# Patient Record
Sex: FEMALE | Race: WHITE
[De-identification: names, ages, dates, MRNs, and addresses within clinical notes are randomized per-mention and may not be internally consistent; named-entity substitution may affect disease eponyms.]

---

## 2017-01-08 ENCOUNTER — HOSPITAL ENCOUNTER (EMERGENCY)
Dept: HOSPITAL 25 - OHEAST | Age: 52
Discharge: HOME | End: 2017-01-08
Payer: SELF-PAY

## 2017-01-08 DIAGNOSIS — Z00.00: Primary | ICD-10-CM

## 2017-06-13 ENCOUNTER — HOSPITAL ENCOUNTER (EMERGENCY)
Dept: HOSPITAL 25 - UCEAST | Age: 52
Discharge: LEFT BEFORE DISCHARGE | End: 2017-06-13
Payer: SELF-PAY

## 2017-06-13 ENCOUNTER — HOSPITAL ENCOUNTER (EMERGENCY)
Dept: HOSPITAL 25 - ED | Age: 52
Discharge: HOME | End: 2017-06-13
Payer: SELF-PAY

## 2017-06-13 VITALS — SYSTOLIC BLOOD PRESSURE: 123 MMHG | DIASTOLIC BLOOD PRESSURE: 72 MMHG

## 2017-06-13 VITALS — SYSTOLIC BLOOD PRESSURE: 124 MMHG | DIASTOLIC BLOOD PRESSURE: 68 MMHG
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
## 2017-06-13 DIAGNOSIS — S70.12XA: Primary | ICD-10-CM
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
## 2017-06-13 DIAGNOSIS — R60.9: ICD-10-CM

## 2017-06-13 DIAGNOSIS — Z53.20: ICD-10-CM

## 2017-06-13 DIAGNOSIS — L03.116: ICD-10-CM
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
## 2017-06-13 LAB
ALBUMIN SERPL BCG-MCNC: 3.7 G/DL (ref 3.2–5.2)
ALP SERPL-CCNC: 72 U/L (ref 34–104)
ALT SERPL W P-5'-P-CCNC: 34 U/L (ref 7–52)
ANION GAP SERPL CALC-SCNC: 6 MMOL/L (ref 2–11)
AST SERPL-CCNC: 24 U/L (ref 13–39)
BUN SERPL-MCNC: 12 MG/DL (ref 6–24)
BUN/CREAT SERPL: 14.6 (ref 8–20)
CALCIUM SERPL-MCNC: 9.5 MG/DL (ref 8.6–10.3)
CHLORIDE SERPL-SCNC: 107 MMOL/L (ref 101–111)
GLOBULIN SER CALC-MCNC: 3.1 G/DL (ref 2–4)
GLUCOSE SERPL-MCNC: 88 MG/DL (ref 70–100)
HCO3 SERPL-SCNC: 26 MMOL/L (ref 22–32)
HCT VFR BLD AUTO: 38 % (ref 35–47)
HGB BLD-MCNC: 12.6 G/DL (ref 12–16)
MCH RBC QN AUTO: 29 PG (ref 27–31)
MCHC RBC AUTO-ENTMCNC: 33 G/DL (ref 31–36)
MCV RBC AUTO: 89 FL (ref 80–97)
POTASSIUM SERPL-SCNC: 3.8 MMOL/L (ref 3.5–5)
PROT SERPL-MCNC: 6.8 G/DL (ref 6.4–8.9)
RBC # BLD AUTO: 4.34 10^6/UL (ref 4–5.4)
SODIUM SERPL-SCNC: 139 MMOL/L (ref 133–145)
WBC # BLD AUTO: 14 10^3/UL (ref 3.5–10.8)

## 2017-06-13 PROCEDURE — 99282 EMERGENCY DEPT VISIT SF MDM: CPT

## 2017-06-13 PROCEDURE — G0463 HOSPITAL OUTPT CLINIC VISIT: HCPCS

## 2017-06-13 PROCEDURE — 85025 COMPLETE CBC W/AUTO DIFF WBC: CPT

## 2017-06-13 PROCEDURE — 80053 COMPREHEN METABOLIC PANEL: CPT

## 2017-06-13 PROCEDURE — 36415 COLL VENOUS BLD VENIPUNCTURE: CPT

## 2017-06-13 PROCEDURE — 86141 C-REACTIVE PROTEIN HS: CPT

## 2017-06-13 PROCEDURE — 99212 OFFICE O/P EST SF 10 MIN: CPT

## 2017-06-13 PROCEDURE — 83605 ASSAY OF LACTIC ACID: CPT

## 2017-06-13 NOTE — RAD
HISTORY: Left upper leg swelling, hematoma



COMPARISONS: None relevant



TECHNIQUE: Multiple transverse and longitudinal ultrasound images were obtained of the

left upper extremity  from the level of the common femoral vein inferiorly through to the

infrapopliteal veins  using grayscale, color Doppler, and spectral Doppler imaging with

and without compression and with augmentation. Comparison images were obtained of the

contralateral common femoral vein.



FINDINGS:

VEINS: The venous system of the left upper extremity  is compressible throughout its

course, with normal flow on color Doppler imaging and normal response to augmentation on

spectral Doppler imaging.



SOFT TISSUES: In the area of trauma by history, in the left lateral thigh, there is a

loculated fluid collection measuring 30.4 x 4.4 x 10.5 cm in size



OTHER FINDINGS: None.



IMPRESSION: 

NO LEFT LOWER EXTREMITY DEEP VEIN THROMBOSIS.

LARGE HEMATOMA OF THE LEFT LATERAL THIGH

## 2017-06-13 NOTE — UC
Lower Extremity/Ankle HPI





- HPI Summary


HPI Summary: 





Patient presents to the  with CC of left upper thigh hematoma after a horse 

"ran over her" 4 days ago and landed on her thigh.  She notes to worsening pain

, hardness below the skin and redness around the area.  She is having 

difficulty walking.  She also endorses swelling in the left ankle.  Bruising 

and erythema over the sight of pain.  Exquisite tenderness on palpation.  She 

denies other injuries.  Pain does not radiate.





- History of Current Complaint


Chief Complaint: UCLowerExtremity


Stated Complaint: LEFT LEG INJURY


Time Seen by Provider: 06/13/17 16:21


Hx Obtained From: Patient


Hx Last Menstrual Period: 5/5/17


Pregnant?: No


Onset/Duration: Sudden Onset


Severity Initially: Moderate


Severity Currently: Moderate


Pain Intensity: 5


Pain Scale Used: 0-10 Numeric


Aggravating Factor(s): Standing, Ambulation


Alleviating Factor(s): Rest, Elevation


Able to Bear Weight: Yes





- Risk Factors


Gout Risk Factors: Age Over 40


DVT Risk Factors: Negative


Septic Arthritis Risk Factor: Negative





- Allergies/Home Medications


Allergies/Adverse Reactions: 


 Allergies











Allergy/AdvReac Type Severity Reaction Status Date / Time


 


Bee Venom Allergy  Anaphylatic Verified 06/13/17 15:53





   Shock  











Home Medications: 


 Home Medications





Multiple Vitamin [Multi Vitamin] 1 tab PO DAILY 06/13/17 [History Confirmed 06/ 13/17]


Sertraline* [Zoloft*] 1 tab PO DAILY 06/13/17 [History Confirmed 06/13/17]











PMH/Surg Hx/FS Hx/Imm Hx


Previously Healthy: Yes





- Surgical History


Surgical History: Yes


Surgery Procedure, Year, and Place: Hernia repair.  back surgery X3





- Family History


Known Family History: Positive: Unknown





- Social History


Occupation: Employed Full-time


Lives: With Family


Alcohol Use: None


Substance Use Type: None


Smoking Status (MU): Never Smoked Tobacco





- Immunization History


Most Recent Tetanus Shot: Unknown





Review of Systems


Constitutional: Negative


Skin: Bruising, Other - erythema around left lateral thigh


Respiratory: Negative


Cardiovascular: Negative


Motor: Decreased ROM


Neurovascular: Decreased Sensation


Musculoskeletal: Negative


Neurological: Negative


Psychological: Negative


All Other Systems Reviewed And Are Negative: Yes





Physical Exam


Triage Information Reviewed: Yes


Appearance: Well-Appearing, No Pain Distress, Well-Nourished


Vital Signs: 


 Initial Vital Signs











Temp  99.3 F   06/13/17 15:49


 


Pulse  81   06/13/17 15:49


 


Resp  16   06/13/17 15:49


 


BP  123/72   06/13/17 15:49


 


Pulse Ox  99   06/13/17 15:49











Vital Signs Reviewed: Yes


Eye Exam: Normal


ENT Exam: Normal


ENT: Positive: Normal ENT inspection


Dental Exam: Normal


Respiratory Exam: Normal


Respiratory: Positive: Chest non-tender, Lungs clear


Cardiovascular Exam: Normal


Musculoskeletal Exam: Normal


Musculoskeletal: Positive: Other: - pain on ROM


Neurological Exam: Normal


Neurological: Positive: Alert


Psychological: Positive: Normal Response To Family, Age Appropriate Behavior


Skin Exam: Normal





Lower Extremity Course/Dx





- Course


Course Of Treatment: left lateral thigh with pain, induration and erythema 

surrounding.  US not available in the UC.  patient encouraged to follow up with 

ED immediately for further workup.  She is agreeable to go by private car.  

Haley Lentz PA-C called at 5:10p.  She is stable upon transfer.





- Differential Dx/Diagnosis


Differential Diagnosis/HQI/PQRI: Contusion, Sprain, Strain, Tendonitis, Other - 

hematoma


Provider Diagnoses: Hematoma of the left leg





Discharge





- Discharge Plan


Condition: Stable


Disposition: AGAINST MEDICAL ADVICE


Discharge Disposition Comment: private car to the emergency room

## 2017-06-13 NOTE — ED
Skin Complaint





- HPI Summary


HPI Summary: 


51F presents with hematoma with potential infection on left upper leg.  She was 

stepped on by a horse four days ago. She states today that she notice some 

redness to the area with warmth that has been spreading.  She states the pain 

is intense and makes it difficult to walk.  She denies any fever.  She was 

wearing pants with the insistent occurred.  She denies any other injury.  She 

denies any abrasion to the area.  She states the entire leg has swollen up.  





- History of Current Complaint


Chief Complaint: EDExtremityLower


Time Seen by Provider: 06/13/17 17:37


Stated Complaint: LT LEG INJURY/SENT FROM [facility]


Hx Last Menstrual Period: 5/5/17


Pain Intensity: 7





- Allergy/Home Medications


Allergies/Adverse Reactions: 


 Allergies











Allergy/AdvReac Type Severity Reaction Status Date / Time


 


Bee Venom Allergy  Anaphylatic Verified 06/13/17 15:53





   Shock  














PMH/Surg Hx/FS Hx/Imm Hx


Endocrine/Hematology History: 


   Denies: Hx Diabetes, Hx Thyroid Disease


Cardiovascular History: 


   Denies: Hx Hypertension


Respiratory History: 


   Denies: Hx Asthma, Hx Chronic Obstructive Pulmonary Disease (COPD)


GI History: 


   Denies: Hx Ulcer





- Surgical History


Surgery Procedure, Year, and Place: Hernia repair.  back surgery X3





- Immunization History


Date of Tetanus Vaccine: UTD


Date of Influenza Vaccine: UTD


Infectious Disease History: No


Infectious Disease History: 


   Denies: Hx Clostridium Difficile, Hx Hepatitis, Hx Human Immunodeficiency 

Virus (HIV), Hx of Known/Suspected MRSA, Hx Shingles, Hx Tuberculosis, Hx Known/

Suspected VRE, Hx Known/Suspected VRSA, History Other Infectious Disease, 

Traveled Outside the [place] in Last 30 Days





- Family History


Known Family History: Positive: Unknown


   Negative: Cardiac Disease





- Social History


Alcohol Use: None


Substance Use Type: Reports: None


Smoking Status (MU): Never Smoked Tobacco





Review of Systems


Negative: Fever


Negative: Chest Pain


Negative: Shortness Of Breath


Positive: Edema - left upper leg


All Other Systems Reviewed And Are Negative: Yes





Physical Exam


Triage Information Reviewed: Yes


Vital Signs On Initial Exam: 


 Initial Vitals











Temp Pulse Resp BP Pulse Ox


 


 98.6 F   68   16   122/77   98 


 


 06/13/17 17:28  06/13/17 17:28  06/13/17 17:28  06/13/17 17:28  06/13/17 17:28











Vital Signs Reviewed: Yes


Appearance: Positive: Well-Appearing


Skin: Positive: Warm, Dry


Head/Face: Positive: Normal Head/Face Inspection


Eyes: Positive: Normal, Conjunctiva Clear


ENT: Positive: Normal ENT inspection, Pharynx normal, TMs normal


Respiratory/Lung Sounds: Positive: Clear to Auscultation, Breath Sounds Present


Cardiovascular: Positive: Normal, RRR


Musculoskeletal: Positive: Other - good pulses, capillary refill < 2 secs, 

tender to area of left thigh with hematoma felt underneath, large area of 

cellulitis present over anterior thigh with erythema and warm to touch





- Severino Coma Scale


Coma Scale Total: 15





Diagnostics





- Vital Signs


 Vital Signs











  Temp Pulse Resp BP Pulse Ox


 


 06/13/17 18:11  98.2 F  68  16  126/74  98


 


 06/13/17 17:28  98.6 F  68  16  122/77  98














- Laboratory


Lab Results: 


 Lab Results











  06/13/17 06/13/17 06/13/17 Range/Units





  18:24 18:24 18:24 


 


WBC  14.0 H    (3.5-10.8)  10^3/ul


 


RBC  4.34    (4.0-5.4)  10^6/ul


 


Hgb  12.6    (12.0-16.0)  g/dl


 


Hct  38    (35-47)  %


 


MCV  89    (80-97)  fL


 


MCH  29    (27-31)  pg


 


MCHC  33    (31-36)  g/dl


 


RDW  14    (10.5-15)  %


 


Plt Count  315    (150-450)  10^3/ul


 


MPV  7 L    (7.4-10.4)  um3


 


Neut % (Auto)  69.9    (38-83)  %


 


Lymph % (Auto)  21.7 L    (25-47)  %


 


Mono % (Auto)  6.2    (1-9)  %


 


Eos % (Auto)  1.3    (0-6)  %


 


Baso % (Auto)  0.9    (0-2)  %


 


Absolute Neuts (auto)  9.8 H    (1.5-7.7)  10^3/ul


 


Absolute Lymphs (auto)  3.0    (1.0-4.8)  10^3/ul


 


Absolute Monos (auto)  0.9 H    (0-0.8)  10^3/ul


 


Absolute Eos (auto)  0.2    (0-0.6)  10^3/ul


 


Absolute Basos (auto)  0.1    (0-0.2)  10^3/ul


 


Absolute Nucleated RBC  0    10^3/ul


 


Nucleated RBC %  0    


 


Sodium   139   (133-145)  mmol/L


 


Potassium   3.8   (3.5-5.0)  mmol/L


 


Chloride   107   (101-111)  mmol/L


 


Carbon Dioxide   26   (22-32)  mmol/L


 


Anion Gap   6   (2-11)  mmol/L


 


BUN   12   (6-24)  mg/dL


 


Creatinine   0.82   (0.51-0.95)  mg/dL


 


Est GFR ( Amer)   94.5   (>60)  


 


Est GFR (Non-Af Amer)   73.5   (>60)  


 


BUN/Creatinine Ratio   14.6   (8-20)  


 


Glucose   88   ()  mg/dL


 


Lactic Acid    0.6  (0.5-2.0)  mmol/L


 


Calcium   9.5   (8.6-10.3)  mg/dL


 


Total Bilirubin   0.50   (0.2-1.0)  mg/dL


 


AST   24   (13-39)  U/L


 


ALT   34   (7-52)  U/L


 


Alkaline Phosphatase   72   ()  U/L


 


C-React Prot High Sens   10.04   mg/L


 


Total Protein   6.8   (6.4-8.9)  g/dL


 


Albumin   3.7   (3.2-5.2)  g/dL


 


Globulin   3.1   (2-4)  g/dL


 


Albumin/Globulin Ratio   1.2   (1-3)  











Result Diagrams: 


 06/13/17 18:24





 06/13/17 18:24


Lab Statement: Any lab studies that have been ordered have been reviewed, and 

results considered in the medical decision making process.





- Ultrasound


  ** No standard instances


Ultrasound Interpretation: Positive (See Comments) -  IMPRESSION: NO LEFT LOWER 

EXTREMITY DEEP VEIN THROMBOSIS. LARGE HEMATOMA OF THE LEFT LATERAL THIGH


Ultrasound Interpretation Completed By: Radiologist





Course/Dx





- Course


Course Of Treatment: 51F presents with rash to area that was bruised 4 days ago 

but a horse.  A horse stepped on her leg and she had some edema there and felt 

a hematoma for past 4 days. today she noticed some redness and warmth on top of 

hematoma that spread. denies any fever. on exam erythema is noted that appears 

cellulitic. hematoma is felt. on abrasion to skin noted. u/s shows hematoma. 

labs wbc 14, crp 10, lactic 6. discussed with dr molina unlikely that infection 

of hematoma without abrasion seen will treat as cellulitis at this point and 

have follow up with primary to see that it improves on keflex. patient 

understands and agrees with plan





- Differential Diagnoses - Skin Complaint


Differential Diagnoses: Abscess, Cellulitis, Systemic Illness





- Diagnoses


Provider Diagnoses: 


 Hematoma of left thigh, Cellulitis of left leg








Discharge





- Discharge Plan


Condition: Good


Disposition: HOME


Prescriptions: 


Cephalexin CAP* [Keflex CAP*] 500 mg PO QID #39 cap


Patient Education Materials:  Cellulitis (ED), Hematoma (ED)


Referrals: 


Nimo Limon MD [Primary Care Provider] - 


Additional Instructions: 


Take Keflex 4 times a day for 10 days


Follow up with primary within 3 days


Once infections resolve can apply heat to hematoma and massage area


Return to ED if develop fever, area of redness spread or any new or worsening 

symptoms

## 2020-01-15 ENCOUNTER — HOSPITAL ENCOUNTER (EMERGENCY)
Dept: HOSPITAL 25 - UCEAST | Age: 55
Discharge: HOME | End: 2020-01-15
Payer: SELF-PAY

## 2020-01-15 VITALS — SYSTOLIC BLOOD PRESSURE: 117 MMHG | DIASTOLIC BLOOD PRESSURE: 63 MMHG
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
## 2020-01-15 DIAGNOSIS — S80.812A: ICD-10-CM

## 2020-01-15 DIAGNOSIS — Z91.030: ICD-10-CM

## 2020-01-15 DIAGNOSIS — S80.12XA: Primary | ICD-10-CM

## 2020-01-15 PROCEDURE — G0463 HOSPITAL OUTPT CLINIC VISIT: HCPCS

## 2020-01-15 PROCEDURE — 99211 OFF/OP EST MAY X REQ PHY/QHP: CPT

## 2020-01-15 NOTE — UC
Knee Pain HPI





- HPI Summary


HPI Summary: 





The patient is a 54-year-old female who injured her left proximal shin 2 days 

ago loading a horse onto a trailer.  She has significant swelling and bruising 

of her proximal shin.  She states that she has decreased sensation in her left 

leg from back issues.  She is experiencing pain from this injury and is making 

her walk with a limp.  She had some bleeding from some abrasions that occurred 

with this injury and states her immunizations are up-to-date.











- History of Current Complaint


Chief Complaint: UCLowerExtremity


Stated Complaint: LEG INJURY


Time Seen by Provider: 01/15/20 09:29


Hx Obtained From: Patient


Hx Last Menstrual Period: 5/5/17


Onset/Duration: Sudden Onset, Lasting Days


Severity Initially: Severe


Severity Currently: Severe


Pain Intensity: 10 - with wt bearing


Pain Scale Used: 0-10 Numeric


Character: Aching, Throbbing


Aggravating Factor(s): Weight Bearing


Alleviating Factor(s): Rest


Associated Signs And Symptoms: Positive: Swelling, Bruising


Able to Bear Weight: Yes


Legs: 


  __________________________














  __________________________





 1 - hematoma with overlying eschar








- Allergies/Home Medications


Allergies/Adverse Reactions: 


 Allergies











Allergy/AdvReac Type Severity Reaction Status Date / Time


 


bee venom protein (honey bee) Allergy  Anaphylatic Verified 01/15/20 09:21





   Shock  











Home Medications: 


 Home Medications





Cinnamon Bark/Chromium Picolin [Cinnamon Plus Chromium Capsule] 1 tab PO DAILY 

01/15/20 [History Confirmed 01/15/20]


L.acidoph,Paracasei, B.lactis [Probiotic] 1 tab PO DAILY 01/15/20 [History 

Confirmed 01/15/20]


Naproxen Sodium [Aleve] 440 mg PO ONCE PRN 01/15/20 [History Confirmed 01/15/20]


Rutin/Hesp/Bioflav/C/[id number] [Bioflex Tablet] 1 tab PO DAILY 01/15/20 [

History Confirmed 01/15/20]











PMH/Surg Hx/FS Hx/Imm Hx


Previously Healthy: Yes - back surgery x 4


Psychological History: Depression





- Surgical History


Surgical History: Yes


Surgery Procedure, Year, and Place: Hernia repair.  back surgery X4 fusion





- Family History


Known Family History: Positive: Unknown, Non-Contributory


   Negative: Cardiac Disease





- Social History


Alcohol Use: None


Substance Use Type: None


Smoking Status (MU): Never Smoked Tobacco





- Immunization History


Most Recent Tetanus Shot: Unknown





Review of Systems


All Other Systems Reviewed And Are Negative: Yes


Constitutional: Positive: Negative


Skin: Positive: Bruising


Eyes: Positive: Negative


ENT: Positive: Negative


Respiratory: Positive: Negative


Cardiovascular: Positive: Negative


Gastrointestinal: Positive: Negative


Genitourinary: Positive: Negative


Motor: Positive: Negative


Musculoskeletal: Positive: Edema


Neurological: Positive: Negative


Psychological: Positive: Negative





Physical Exam


Triage Information Reviewed: Yes


Appearance: Well-Appearing, No Pain Distress, Well-Nourished


Vital Signs: 


 Initial Vital Signs











Temp  97.8 F   01/15/20 09:15


 


Pulse  68   01/15/20 09:15


 


Resp  18   01/15/20 09:15


 


BP  117/63   01/15/20 09:15


 


Pulse Ox  97   01/15/20 09:15











Vital Signs Reviewed: Yes


Eyes: Positive: Conjunctiva Clear


ENT: Positive: Hearing grossly normal, Uvula midline.  Negative: Nasal 

congestion, Nasal drainage, Tonsillar swelling, Tonsillar exudate, Hoarse voice


Neck: Positive: Supple, Nontender, No Lymphadenopathy


Respiratory: Positive: Lungs clear, Normal breath sounds, No respiratory 

distress


Cardiovascular: Positive: RRR, No Murmur


Bowel Sounds: Positive: Present


Musculoskeletal: Positive: ROM Intact, Other: - The patient has significant 

edema and ecchymosis over her proximal left shin.  She has some eschars.  Her 

patella is nontender.  I do not appreciate a knee effusion.  She has good 

distal pulses but her sensation is diminished which is a chronic finding due to 

her back issues.


Neurological: Positive: Alert


Psychological Exam: Normal


Skin Exam: Normal





Diagnostics





- Radiology


  ** No standard instances


Radiology Interpretation Completed By: Radiologist


Summary of Radiographic Findings: no fx





Knee Pain Course/Dx





- Differential Dx/Diagnosis


Provider Diagnosis: 


 Hematoma of left lower extremity, Abrasion








Discharge ED





- Sign-Out/Discharge


Documenting (check all that apply): Patient Departure


All imaging exams completed and their final reports reviewed: Yes





- Discharge Plan


Condition: Stable


Disposition: HOME


Patient Education Materials:  Hematoma (ED), Abrasion (ED)


Referrals: 


Nimo Limon MD [Primary Care Provider] - 1 Week


Additional Instructions: 


rest


elevate


ice


aleve 1-2 twice daily











- Billing Disposition and Condition


Condition: STABLE


Disposition: Home